# Patient Record
Sex: MALE | Race: WHITE | NOT HISPANIC OR LATINO | Employment: UNEMPLOYED | URBAN - METROPOLITAN AREA
[De-identification: names, ages, dates, MRNs, and addresses within clinical notes are randomized per-mention and may not be internally consistent; named-entity substitution may affect disease eponyms.]

---

## 2018-01-22 ENCOUNTER — ALLSCRIPTS OFFICE VISIT (OUTPATIENT)
Dept: URGENT CARE | Age: 43
End: 2018-01-22
Payer: COMMERCIAL

## 2018-01-22 PROCEDURE — G0382 LEV 3 HOSP TYPE B ED VISIT: HCPCS | Performed by: FAMILY MEDICINE

## 2018-01-24 NOTE — PROGRESS NOTES
Assessment   1  Acute sinus infection (461 9) (J01 90)    Plan   Acute sinus infection    · Start: Amoxicillin-Pot Clavulanate 875-125 MG Oral Tablet (Augmentin); TAKE 1 TABLET    EVERY 12 HOURS DAILY    Discussion/Summary   Discussion Summary:    Rest and drink extra fluids  Start antibiotic  Take probiotic  OTC cough and cold medications as needed  Follow up with PCP if no improvement  Go to ER with worsening symptoms  Medication Side Effects Reviewed: Possible side effects of new medications were reviewed with the patient/guardian today  Understands and agrees with treatment plan: The treatment plan was reviewed with the patient/guardian  The patient/guardian understands and agrees with the treatment plan    Counseling Documentation With Imm: The patient was counseled regarding instructions for management,-- patient and family education,-- importance of compliance with treatment  Follow Up Instructions: Follow Up with your Primary Care Provider in days  If your symptoms worsen, go to the nearest [facility] Emergency Department  Chief Complaint   1  Cold Symptoms  Chief Complaint Free Text Note Form: Has had URI x 6 days  Worse since Friday with congested cough, fever 102 (today 101 - no Tylenol or Motrin), body aches, HA and occ  nausea  Had diarrhea on Friday  History of Present Illness   HPI: This is a 43year old male here today with fevers, cough, nasal congestion, body aches and chills  He has had symptoms for about 5-6 days  3 days ago symptoms became worse  He had fever of 102  He has had body aches and headaches  Nasal discharge is yellow  Hospital Based Practices Required Assessment:      Pain Assessment      the patient states they have pain  The pain is located in the cough, body aches, HA and fever  (on a scale of 0 to 10, the patient rates the pain at 9 )      Abuse And Domestic Violence Screen       Yes, the patient is safe at home  -- The patient states no one is hurting them        Depression And Suicide Screen  No, the patient has not had thoughts of hurting themself  No, the patient has not felt depressed in the past 7 days  Prefered Language is  [place]  Primary Language is  English  Review of Systems   Focused-Male:      Constitutional: fever,-- feeling poorly-- and-- chills, but-- as noted in HPI       ENT: nasal discharge, but-- as noted in HPI  Cardiovascular: no complaints of slow or fast heart rate, no chest pain, no palpitations, no leg claudication or lower extremity edema  Respiratory: cough, but-- as noted in HPI  Neurological: headache, but-- as noted in HPI  ROS Reviewed:    ROS reviewed  Active Problems   1  Allergic rhinitis (477 9) (J30 9)  2  Attention deficit hyperactivity disorder (314 01) (F90 9)  3  Need for prophylactic vaccination and inoculation against influenza (V04 81) (Z23)  4  Tooth pain (525 9) (K08 89)    Past Medical History   1  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)  2  History of Acute upper respiratory infection (465 9) (J06 9)  3  History of Cellulitis (682 9) (L03 90)  4  History of Contusion Of The Right Elbow With Intact Skin Surface (923 11)  5  History of Elbow pain, unspecified laterality (719 42) (M25 529)  6  History of allergic rhinitis (V12 69) (Z87 09)  7  History of diverticulitis of colon (V12 79) (Z87 19)  8  History of falling (V15 88) (Z91 81)  9  History of scarlet fever (V12 09) (Z86 19)  10  Need for prophylactic vaccination and inoculation against influenza (V04 81) (Z23)  11  History of Tooth pain (525 9) (K08 89)  Active Problems And Past Medical History Reviewed: The active problems and past medical history were reviewed and updated today  Family History   Mother   1  Family history of Hepatitis  Maternal Grandfather   2  Family history of Coronary Artery Disease (V17 49)  Family History Reviewed:     The family history was reviewed and updated today  Social History    · Current Every Day Smoker (305 1)   · Denied: History of Drug use   · Social alcohol use (Z78 9)   · Uses Safety Equipment - Seatbelts  Social History Reviewed: The social history was reviewed and updated today  The social history was reviewed and is unchanged  Surgical History   1  History of Dental Surgery  Surgical History Reviewed: The surgical history was reviewed and updated today  Current Meds   1  Fluticasone Propionate 50 MCG/ACT Nasal Suspension; USE 1 SPRAY IN EACH     NOSTRIL TWICE DAILY; Therapy: [id number] to (Last Rx:10Oct2014)  Requested for: 10Oct2014 Ordered  2  Multi-Day Vitamins TABS; Therapy: (Recorded:22Jan2018) to Recorded  Medication List Reviewed: The medication list was reviewed and updated today  Allergies   1  No Known Drug Allergies    Vitals   Signs   Recorded: 84CHF3853 08:18PM   Temperature: 98 3 F, Oral  Heart Rate: 80  Pulse Quality: Regular  Respiration: 18  Systolic: 643, RUE, Sitting  Diastolic: 60, RUE, Sitting  Height: 6 ft 1 in  Weight: 148 lb 6 4 oz  BMI Calculated: 19 58  BSA Calculated: 1 9  O2 Saturation: 97  Pain Scale: 9    Physical Exam        Constitutional      General appearance: No acute distress, well appearing and well nourished  Ears, Nose, Mouth, and Throat      Otoscopic examination: Tympanic membrance translucent with normal light reflex  Canals patent without erythema  Nasal mucosa, septum, and turbinates: Abnormal  -- yellow nasal discharge  Oropharynx: Normal with no erythema, edema, exudate or lesions  Pulmonary      Respiratory effort: No increased work of breathing or signs of respiratory distress  Auscultation of lungs: Clear to auscultation  Cardiovascular      Palpation of heart: Normal PMI, no thrills  Auscultation of heart: Normal rate and rhythm, normal S1 and S2, without murmurs         Psychiatric      Orientation to person, place and time: Normal        Mood and affect: Normal        Message   Return to work or school:    Marily Reyes is under my professional care   He was seen in my office on 01/22/2018    He is able to return to work on  01/25/2018             Signatures    Electronically signed by : Ramona Tobias; Jan 22 2018  8:36PM EST                       (Author)     Electronically signed by : Warner Cartagena DO; Jan 23 2018  7:12AM EST                       (Co-author)

## 2019-03-12 ENCOUNTER — OFFICE VISIT (OUTPATIENT)
Dept: FAMILY MEDICINE CLINIC | Facility: CLINIC | Age: 44
End: 2019-03-12

## 2019-03-12 VITALS
DIASTOLIC BLOOD PRESSURE: 92 MMHG | TEMPERATURE: 96.6 F | HEART RATE: 104 BPM | BODY MASS INDEX: 20.04 KG/M2 | WEIGHT: 151.2 LBS | RESPIRATION RATE: 18 BRPM | SYSTOLIC BLOOD PRESSURE: 122 MMHG | HEIGHT: 73 IN

## 2019-03-12 DIAGNOSIS — R74.8 ELEVATED LIVER ENZYMES: ICD-10-CM

## 2019-03-12 DIAGNOSIS — F17.200 SMOKING: ICD-10-CM

## 2019-03-12 DIAGNOSIS — Z13.6 SCREENING FOR CARDIOVASCULAR CONDITION: ICD-10-CM

## 2019-03-12 DIAGNOSIS — F90.0 ATTENTION DEFICIT HYPERACTIVITY DISORDER (ADHD), PREDOMINANTLY INATTENTIVE TYPE: ICD-10-CM

## 2019-03-12 DIAGNOSIS — J30.2 SEASONAL ALLERGIES: Primary | ICD-10-CM

## 2019-03-12 PROBLEM — IMO0001 SMOKING: Status: ACTIVE | Noted: 2019-03-12

## 2019-03-12 PROCEDURE — 99213 OFFICE O/P EST LOW 20 MIN: CPT | Performed by: FAMILY MEDICINE

## 2019-03-12 PROCEDURE — 3008F BODY MASS INDEX DOCD: CPT | Performed by: FAMILY MEDICINE

## 2019-03-12 RX ORDER — NICOTINE 21 MG/24HR
1 PATCH, TRANSDERMAL 24 HOURS TRANSDERMAL EVERY 24 HOURS
Qty: 28 PATCH | Refills: 0 | Status: SHIPPED | OUTPATIENT
Start: 2019-03-12

## 2019-03-12 RX ORDER — LORATADINE 10 MG/1
10 TABLET ORAL DAILY
Qty: 60 TABLET | Refills: 1 | Status: SHIPPED | OUTPATIENT
Start: 2019-03-12

## 2019-03-12 NOTE — PROGRESS NOTES
Assessment/Plan     Attention deficit hyperactivity disorder (ADHD), predominantly inattentive type  Referral for Psychiatry given for further evaluation    Elevated liver enzymes  Will recheck CMP, patient reports that hep panel was also done which was negative, and he will drop his records from [address] [place] [address] for cardiovascular condition  Scripts for lipid panel and CMP given    Seasonal allergies  Continue Flonase, will also add Claritin 10 mg daily    Smoking  Counseled patient on quitting smoking, agreeable on trying nicotine patches, explained that we will start him on 14 mg patch for 28 days and then taper down to 7 mg over the next 2 weeks  Patient agreed with plan, will follow-up in 1 month    Diagnoses and all orders for this visit:    Seasonal allergies  -     loratadine (CLARITIN) 10 mg tablet; Take 1 tablet (10 mg total) by mouth daily    Elevated liver enzymes  -     Comprehensive metabolic panel; Future    Screening for cardiovascular condition  -     Comprehensive metabolic panel; Future  -     Lipid panel; Future    Smoking  -     nicotine (NICODERM CQ) 14 mg/24hr TD 24 hr patch; Place 1 patch on the skin every 24 hours    Attention deficit hyperactivity disorder (ADHD), predominantly inattentive type  -     Ambulatory referral to Psychiatry; Future    Other orders  -     nystatin (MYCOSTATIN) 100,000 units/mL suspension; TAKE 5 ML BY MOUTH 4 TIMES A DAY         Subjective     Chief Complaint   Patient presents with    Medication Refill       Dano Rojas is a 57-year-old male who presented to office to establish care and also wants blood work done  He states that he had a bad month when he was initially diagnosed with strep throat and given Augmentin, since the nasal stuffiness sinusitis like symptoms persisted he was given another antibiotic course with azithromycin and that resulted in oral thrush and now he is feeling a little bit better    He was at [facility] all this time, and states in 1 of the blood test his liver enzymes were found to be elevated, he is not sure how elevated but wants blood test to be done  He also states that he has ADHD since the age of 9 and takes Adderall as needed, but has not taken it for a year and would like to continue that medication  He has chronic allergic rhinitis and uses Flonase  He used to get allergy shots years ago, but did not make any difference so he stopped going to an allergist   He smokes about half a pack a day, drinks alcohol occasionally, denies use of any recreational drugs  The following portions of the patient's history were reviewed and updated as appropriate: allergies, current medications, past family history, past medical history, past social history, past surgical history and problem list     Review of Systems   Constitutional: Negative for activity change, chills and fever  HENT: Positive for congestion and voice change  Negative for sinus pain, sneezing and sore throat  Respiratory: Negative for shortness of breath and wheezing  Cardiovascular: Negative for chest pain and palpitations  Gastrointestinal: Negative for diarrhea, nausea and vomiting  Genitourinary: Negative for difficulty urinating  Neurological: Negative for dizziness and headaches  Objective     Vitals:Blood pressure 122/92, pulse 104, temperature (!) 96 6 °F (35 9 °C), temperature source Tympanic, resp  rate 18, height 6' 1 3" (1 862 m), weight 68 6 kg (151 lb 3 2 oz)  Physical Exam:  Physical Exam   Constitutional: He is oriented to person, place, and time  He appears well-developed and well-nourished  HENT:   Head: Normocephalic and atraumatic  Mouth/Throat: Oropharynx is clear and moist    Eyes: Conjunctivae and EOM are normal    Neck: Normal range of motion  Neck supple  Cardiovascular: Normal rate, regular rhythm and normal heart sounds  Exam reveals no friction rub  No murmur heard    Pulmonary/Chest: Effort normal and breath sounds normal  No respiratory distress  He has no wheezes  He has no rales  Abdominal: Soft  Bowel sounds are normal  He exhibits no distension  There is no tenderness  Musculoskeletal: Normal range of motion  Neurological: He is alert and oriented to person, place, and time  Skin: Skin is warm and dry

## 2019-03-12 NOTE — ASSESSMENT & PLAN NOTE
Will recheck CMP, patient reports that hep panel was also done which was negative, and he will drop his records from [address]

## 2019-03-12 NOTE — ASSESSMENT & PLAN NOTE
Counseled patient on quitting smoking, agreeable on trying nicotine patches, explained that we will start him on 14 mg patch for 28 days and then taper down to 7 mg over the next 2 weeks    Patient agreed with plan, will follow-up in 1 month

## 2019-03-14 ENCOUNTER — LAB (OUTPATIENT)
Dept: LAB | Facility: CLINIC | Age: 44
End: 2019-03-14
Payer: COMMERCIAL

## 2019-03-14 DIAGNOSIS — Z13.6 SCREENING FOR CARDIOVASCULAR CONDITION: ICD-10-CM

## 2019-03-14 DIAGNOSIS — R74.8 ELEVATED LIVER ENZYMES: ICD-10-CM

## 2019-03-14 LAB
ALBUMIN SERPL BCP-MCNC: 3.9 G/DL (ref 3.5–5)
ALP SERPL-CCNC: 79 U/L (ref 46–116)
ALT SERPL W P-5'-P-CCNC: 45 U/L (ref 12–78)
ANION GAP SERPL CALCULATED.3IONS-SCNC: 4 MMOL/L (ref 4–13)
AST SERPL W P-5'-P-CCNC: 20 U/L (ref 5–45)
BILIRUB SERPL-MCNC: 0.69 MG/DL (ref 0.2–1)
BUN SERPL-MCNC: 14 MG/DL (ref 5–25)
CALCIUM SERPL-MCNC: 8.9 MG/DL (ref 8.3–10.1)
CHLORIDE SERPL-SCNC: 105 MMOL/L (ref 100–108)
CHOLEST SERPL-MCNC: 156 MG/DL (ref 50–200)
CO2 SERPL-SCNC: 28 MMOL/L (ref 21–32)
CREAT SERPL-MCNC: 0.78 MG/DL (ref 0.6–1.3)
GFR SERPL CREATININE-BSD FRML MDRD: 110 ML/MIN/1.73SQ M
GLUCOSE P FAST SERPL-MCNC: 79 MG/DL (ref 65–99)
HDLC SERPL-MCNC: 48 MG/DL (ref 40–60)
LDLC SERPL CALC-MCNC: 80 MG/DL (ref 0–100)
NONHDLC SERPL-MCNC: 108 MG/DL
POTASSIUM SERPL-SCNC: 3.8 MMOL/L (ref 3.5–5.3)
PROT SERPL-MCNC: 7.2 G/DL (ref 6.4–8.2)
SODIUM SERPL-SCNC: 137 MMOL/L (ref 136–145)
TRIGL SERPL-MCNC: 142 MG/DL

## 2019-03-14 PROCEDURE — 80053 COMPREHEN METABOLIC PANEL: CPT

## 2019-03-14 PROCEDURE — 36415 COLL VENOUS BLD VENIPUNCTURE: CPT

## 2019-03-14 PROCEDURE — 80061 LIPID PANEL: CPT

## 2024-02-21 PROBLEM — Z13.6 SCREENING FOR CARDIOVASCULAR CONDITION: Status: RESOLVED | Noted: 2019-03-12 | Resolved: 2024-02-21

## 2024-06-27 ENCOUNTER — TELEPHONE (OUTPATIENT)
Age: 49
End: 2024-06-27

## 2024-06-27 ENCOUNTER — OFFICE VISIT (OUTPATIENT)
Age: 49
End: 2024-06-27
Payer: COMMERCIAL

## 2024-06-27 VITALS
OXYGEN SATURATION: 98 % | SYSTOLIC BLOOD PRESSURE: 100 MMHG | HEART RATE: 103 BPM | BODY MASS INDEX: 18.71 KG/M2 | DIASTOLIC BLOOD PRESSURE: 70 MMHG | WEIGHT: 141.2 LBS | HEIGHT: 73 IN | TEMPERATURE: 98.1 F

## 2024-06-27 DIAGNOSIS — F17.200 SMOKING: ICD-10-CM

## 2024-06-27 DIAGNOSIS — Z79.899 MEDICATION MANAGEMENT: ICD-10-CM

## 2024-06-27 DIAGNOSIS — Z76.89 ENCOUNTER TO ESTABLISH CARE: ICD-10-CM

## 2024-06-27 DIAGNOSIS — F32.A ANXIETY AND DEPRESSION: ICD-10-CM

## 2024-06-27 DIAGNOSIS — E78.5 HYPERLIPIDEMIA, UNSPECIFIED HYPERLIPIDEMIA TYPE: ICD-10-CM

## 2024-06-27 DIAGNOSIS — F41.9 ANXIETY AND DEPRESSION: ICD-10-CM

## 2024-06-27 DIAGNOSIS — Z12.11 ENCOUNTER FOR COLORECTAL CANCER SCREENING: Primary | ICD-10-CM

## 2024-06-27 DIAGNOSIS — Z13.228 SCREENING FOR METABOLIC DISORDER: ICD-10-CM

## 2024-06-27 DIAGNOSIS — E03.9 HYPOTHYROIDISM, UNSPECIFIED TYPE: ICD-10-CM

## 2024-06-27 DIAGNOSIS — L98.9 SKIN LESIONS: ICD-10-CM

## 2024-06-27 DIAGNOSIS — Z12.12 ENCOUNTER FOR COLORECTAL CANCER SCREENING: Primary | ICD-10-CM

## 2024-06-27 DIAGNOSIS — R79.89 ABNORMAL TSH: ICD-10-CM

## 2024-06-27 DIAGNOSIS — F90.0 ATTENTION DEFICIT HYPERACTIVITY DISORDER (ADHD), PREDOMINANTLY INATTENTIVE TYPE: ICD-10-CM

## 2024-06-27 PROCEDURE — 99203 OFFICE O/P NEW LOW 30 MIN: CPT | Performed by: NURSE PRACTITIONER

## 2024-06-27 PROCEDURE — 93000 ELECTROCARDIOGRAM COMPLETE: CPT | Performed by: NURSE PRACTITIONER

## 2024-06-27 RX ORDER — DEXTROAMPHETAMINE SACCHARATE, AMPHETAMINE ASPARTATE MONOHYDRATE, DEXTROAMPHETAMINE SULFATE AND AMPHETAMINE SULFATE 2.5; 2.5; 2.5; 2.5 MG/1; MG/1; MG/1; MG/1
10 CAPSULE, EXTENDED RELEASE ORAL EVERY MORNING
Qty: 30 CAPSULE | Refills: 0 | Status: SHIPPED | OUTPATIENT
Start: 2024-06-27

## 2024-06-27 RX ORDER — BUPROPION HYDROCHLORIDE 150 MG/1
150 TABLET ORAL EVERY MORNING
Qty: 30 TABLET | Refills: 5 | Status: SHIPPED | OUTPATIENT
Start: 2024-06-27 | End: 2024-12-24

## 2024-06-27 NOTE — PROGRESS NOTES
Assessment/Plan:    Smoking  -Encouraged smoking cessation  -Will start Wellbutrin    Attention deficit hyperactivity disorder (ADHD), predominantly inattentive type  -Will start Adderall 10 mg tablet daily  -Will also start Wellbutrin 150 mg tablet daily  -ADHD screening tool completed while in office today  -PDMP reviewed  -Controlled substance agreement signed  -Follow-up in 1 month or sooner if needed    Encounter to establish care  -Will get screening blood work  -Discussed Tdap vaccination patient defers at this time discuss at next office visit  -Due for colon cancer screening, patient will call to schedule        Depression Screening and Follow-up Plan: Patient was screened for depression during today's encounter. They screened negative with a PHQ-2 score of 0.    Tobacco Cessation Counseling: Tobacco cessation counseling was provided. The patient is sincerely urged to quit consumption of tobacco. He is ready to quit tobacco. Medication options and side effects of medication discussed. Patient agreed to medication. Bupropion SR was prescribed.            Diagnoses and all orders for this visit:    Encounter for colorectal cancer screening  -     Ambulatory Referral to Gastroenterology; Future    Medication management  -     POCT ECG    Skin lesions  -     Ambulatory Referral to Dermatology; Future    Screening for metabolic disorder  -     Comprehensive metabolic panel; Future  -     CBC and differential  -     Lipid panel    Hyperlipidemia, unspecified hyperlipidemia type  -     Lipid panel    Abnormal TSH  -     TSH, 3rd generation with Free T4 reflex    Hypothyroidism, unspecified type  -     TSH, 3rd generation with Free T4 reflex    Attention deficit hyperactivity disorder (ADHD), predominantly inattentive type  -     amphetamine-dextroamphetamine (ADDERALL XR, 10MG,) 10 MG 24 hr capsule; Take 1 capsule (10 mg total) by mouth every morning Max Daily Amount: 10 mg  -     buPROPion (WELLBUTRIN XL) 150 mg  24 hr tablet; Take 1 tablet (150 mg total) by mouth every morning    Anxiety and depression  -     amphetamine-dextroamphetamine (ADDERALL XR, 10MG,) 10 MG 24 hr capsule; Take 1 capsule (10 mg total) by mouth every morning Max Daily Amount: 10 mg  -     buPROPion (WELLBUTRIN XL) 150 mg 24 hr tablet; Take 1 tablet (150 mg total) by mouth every morning    Smoking    Encounter to establish care          Subjective:      Patient ID: Kenneth Bird is a 49 y.o. male.    Patient presents today to establish care with our office.  Works as a  at [employer]  He reports that he had a previous PCP however had lost his insurance and recently obtained insurance.    Denies significant past medical history    ADHD-history of ADHD, since age 7, previously took Adderall as needed, he reports that he was on Adderall 30mg in the past   He reports that his mind races, hyperactive   He reports anxiety, 1-2 panic attacks over the past year     Patient is requesting referral to dermatology for mole to his right hip    Has history of diverticulitis     Tobacco abuse-current smoker, 1/2 pack a day   Alcohol abuse-socially   Illegal drug use-denies            The following portions of the patient's history were reviewed and updated as appropriate: allergies, current medications, past family history, past medical history, past social history, past surgical history, and problem list.    Review of Systems   Constitutional:  Negative for activity change, appetite change, chills, diaphoresis and fever.   HENT:  Negative for congestion, ear discharge, ear pain, postnasal drip, rhinorrhea, sinus pressure, sinus pain and sore throat.    Eyes:  Negative for pain, discharge, itching and visual disturbance.   Respiratory:  Negative for cough, chest tightness, shortness of breath and wheezing.    Cardiovascular:  Negative for chest pain, palpitations and leg swelling.   Gastrointestinal:  Negative for abdominal pain, constipation, diarrhea, nausea and  vomiting.   Endocrine: Negative for polydipsia, polyphagia and polyuria.   Genitourinary:  Negative for difficulty urinating, dysuria and urgency.   Musculoskeletal:  Negative for arthralgias, back pain and neck pain.   Skin:  Negative for rash and wound.   Neurological:  Negative for dizziness, weakness, numbness and headaches.   Psychiatric/Behavioral:  Positive for decreased concentration. Negative for dysphoric mood, self-injury, sleep disturbance and suicidal ideas. The patient is nervous/anxious and is hyperactive.          Past Medical History:   Diagnosis Date    Accidental poisoning by lead and its compounds and fumes     ADHD     Allergic     Allergic rhinitis     Contusion of right elbow     last assessed: 7/1/2013    Diverticulitis     Diverticulitis of colon     last assessed: 4/11/2014    Scarlet fever          Current Outpatient Medications:     amphetamine-dextroamphetamine (ADDERALL XR, 10MG,) 10 MG 24 hr capsule, Take 1 capsule (10 mg total) by mouth every morning Max Daily Amount: 10 mg, Disp: 30 capsule, Rfl: 0    buPROPion (WELLBUTRIN XL) 150 mg 24 hr tablet, Take 1 tablet (150 mg total) by mouth every morning, Disp: 30 tablet, Rfl: 5    loratadine (CLARITIN) 10 mg tablet, Take 1 tablet (10 mg total) by mouth daily (Patient not taking: Reported on 6/27/2024), Disp: 60 tablet, Rfl: 1    nicotine (NICODERM CQ) 14 mg/24hr TD 24 hr patch, Place 1 patch on the skin every 24 hours (Patient not taking: Reported on 6/27/2024), Disp: 28 patch, Rfl: 0    nystatin (MYCOSTATIN) 100,000 units/mL suspension, TAKE 5 ML BY MOUTH 4 TIMES A DAY (Patient not taking: Reported on 6/27/2024), Disp: , Rfl: 0    No Known Allergies    Social History   Past Surgical History:   Procedure Laterality Date    DENTAL SURGERY       Family History   Problem Relation Age of Onset    Hepatitis Mother     Heart disease Maternal Grandfather     Coronary artery disease Maternal Grandfather        Objective:  /70 (BP Location:  "Left arm, Patient Position: Sitting, Cuff Size: Standard)   Pulse 103   Temp 98.1 °F (36.7 °C) (Temporal)   Ht 6' 1.11\" (1.857 m)   Wt 64 kg (141 lb 3.2 oz)   SpO2 98%   BMI 18.57 kg/m²     No results found for this or any previous visit (from the past 1344 hour(s)).         Physical Exam  Constitutional:       General: He is not in acute distress.     Appearance: He is well-developed. He is not diaphoretic.   HENT:      Head: Normocephalic and atraumatic.      Right Ear: External ear normal.      Left Ear: External ear normal.      Nose: Nose normal.      Mouth/Throat:      Mouth: Mucous membranes are moist.      Pharynx: No oropharyngeal exudate or posterior oropharyngeal erythema.   Eyes:      General:         Right eye: No discharge.         Left eye: No discharge.      Conjunctiva/sclera: Conjunctivae normal.      Pupils: Pupils are equal, round, and reactive to light.   Neck:      Thyroid: No thyromegaly.   Cardiovascular:      Rate and Rhythm: Normal rate and regular rhythm.      Heart sounds: Normal heart sounds. No murmur heard.     No friction rub. No gallop.   Pulmonary:      Effort: Pulmonary effort is normal. No respiratory distress.      Breath sounds: Normal breath sounds. No stridor. No wheezing or rales.   Abdominal:      General: Bowel sounds are normal. There is no distension.      Palpations: Abdomen is soft.      Tenderness: There is no abdominal tenderness.   Musculoskeletal:      Cervical back: Normal range of motion and neck supple.   Lymphadenopathy:      Cervical: No cervical adenopathy.   Skin:     General: Skin is warm and dry.      Findings: No erythema or rash.   Neurological:      Mental Status: He is alert and oriented to person, place, and time.   Psychiatric:         Mood and Affect: Mood is not anxious.         Behavior: Behavior is hyperactive.         Thought Content: Thought content normal.         Judgment: Judgment normal.         "

## 2024-06-27 NOTE — TELEPHONE ENCOUNTER
Reason for call:   [x] Prior Auth  [] Other:     Caller:  [] Patient  [x] Pharmacy  Name: [facility]   Address:   Callback Number: [phone]    Medication: Adderall XR     Dose/Frequency: 10mg/ one tablet daily (max 10mg)    Quantity: 30    Ordering Provider:   [x] PCP/Provider -   [] Speciality/Provider -     Has the patient tried other medications and failed? If failed, which medications did they fail?    [x] No   [] Yes -     Is the patient's insurance updated in EPIC?   [x] Yes   [] No     Is a copy of the patient's insurance scanned in EPIC?   [x] Yes   [] No

## 2024-06-27 NOTE — TELEPHONE ENCOUNTER
Pt called to let us know that he needed a PA for the Adderall XR. I see one was already started by Josiane.    CARMEN

## 2024-06-27 NOTE — ASSESSMENT & PLAN NOTE
-Will get screening blood work  -Discussed Tdap vaccination patient defers at this time discuss at next office visit  -Due for colon cancer screening, patient will call to schedule

## 2024-06-27 NOTE — ASSESSMENT & PLAN NOTE
-Will start Adderall 10 mg tablet daily  -Will also start Wellbutrin 150 mg tablet daily  -ADHD screening tool completed while in office today  -EKG reviewed  -PDMP reviewed  -Controlled substance agreement signed  -Follow-up in 1 month or sooner if needed

## 2024-07-02 NOTE — TELEPHONE ENCOUNTER
PT call to get the PA for the medication Adderall XR. PT already change the PCP with his insurance. PT would like to inform the doctor to put in for the PA for Adderall. Thank you

## 2024-07-02 NOTE — TELEPHONE ENCOUNTER
Please advise patient needs PRIOR AUTHORIZATION for medication   If not is not started already    Thank you

## 2024-07-19 ENCOUNTER — TELEPHONE (OUTPATIENT)
Age: 49
End: 2024-07-19

## 2024-07-19 NOTE — TELEPHONE ENCOUNTER
Trying to locate PA for patients medication requested, requested PA as patient called again to inquire.

## 2024-07-23 ENCOUNTER — TELEPHONE (OUTPATIENT)
Age: 49
End: 2024-07-23

## 2024-07-23 NOTE — TELEPHONE ENCOUNTER
Smita HAMMER calling from [employer] calling in to let us know they cannot move forward with the expedited appeal until they hear back from the member. I did attempt to call him as well but the phone number we have is not in service as of now.     Patient needs to update info here and with the state per Smita. She would also like him to call and ask for her at [phone].     Please let patient know at next office visit. Thank you!

## 2025-03-26 ENCOUNTER — TELEPHONE (OUTPATIENT)
Age: 50
End: 2025-03-26